# Patient Record
Sex: MALE | Race: WHITE
[De-identification: names, ages, dates, MRNs, and addresses within clinical notes are randomized per-mention and may not be internally consistent; named-entity substitution may affect disease eponyms.]

---

## 2019-07-08 LAB
BLD SMEAR INTERP: (no result)
BUN BLD-MCNC: 23 MG/DL (ref 7–18)
BURR CELLS BLD QL SMEAR: (no result)
GLUCOSE SERPLBLD-MCNC: 96 MG/DL (ref 74–106)
HCT VFR BLD CALC: 39.1 % (ref 39.6–49)
HYPOCHROMIA BLD QL: (no result)
LYMPHOCYTES # SPEC AUTO: 1.9 K/UL (ref 0.7–4.9)
MORPHOLOGY BLD-IMP: (no result)
PMV BLD: 8.8 FL (ref 7.6–11.3)
POTASSIUM SERPL-SCNC: 4.2 MMOL/L (ref 3.5–5.1)
RBC # BLD: 6.1 M/UL (ref 4.33–5.43)
TARGETS BLD QL SMEAR: (no result)

## 2019-07-08 NOTE — EKG
Test Date:    2019-07-08               Test Time:    15:13:19

Technician:   LIN                                    

                                                     

MEASUREMENT RESULTS:                                       

Intervals:                                           

Rate:         77                                     

CT:           182                                    

QRSD:         80                                     

QT:           388                                    

QTc:          439                                    

Axis:                                                

P:            48                                     

CT:           182                                    

QRS:          -30                                    

T:            52                                     

                                                     

INTERPRETIVE STATEMENTS:                                       

                                                     

Normal sinus rhythm

Left axis deviation

Abnormal ECG

Compared to ECG 09/07/2018 08:45:45

Left-axis deviation now present



Electronically Signed On 07-08-19 16:19:18 CDT by Dario Salomon

## 2019-07-08 NOTE — RAD REPORT
EXAM DESCRIPTION:  Boni Andrea (2 Views)7/8/2019 3:26 pm

 

CLINICAL HISTORY:  Abdominal pain/preop for hernia repair

 

COMPARISON:  None

 

FINDINGS:   The lungs appear clear of acute infiltrate. The heart is borderline enlarged

 

IMPRESSION:   No acute abnormalities displayed

## 2019-07-10 ENCOUNTER — HOSPITAL ENCOUNTER (OUTPATIENT)
Dept: HOSPITAL 97 - OR | Age: 79
Discharge: HOME | End: 2019-07-10
Attending: SURGERY
Payer: COMMERCIAL

## 2019-07-10 DIAGNOSIS — Z85.51: ICD-10-CM

## 2019-07-10 DIAGNOSIS — Z88.2: ICD-10-CM

## 2019-07-10 DIAGNOSIS — K40.30: Primary | ICD-10-CM

## 2019-07-10 PROCEDURE — 88302 TISSUE EXAM BY PATHOLOGIST: CPT

## 2019-07-10 PROCEDURE — 85025 COMPLETE CBC W/AUTO DIFF WBC: CPT

## 2019-07-10 PROCEDURE — 0YU60JZ SUPPLEMENT LEFT INGUINAL REGION WITH SYNTHETIC SUBSTITUTE, OPEN APPROACH: ICD-10-PCS

## 2019-07-10 PROCEDURE — 80048 BASIC METABOLIC PNL TOTAL CA: CPT

## 2019-07-10 PROCEDURE — 93005 ELECTROCARDIOGRAM TRACING: CPT

## 2019-07-10 PROCEDURE — 36415 COLL VENOUS BLD VENIPUNCTURE: CPT

## 2019-07-10 PROCEDURE — 49507 PRP I/HERN INIT BLOCK >5 YR: CPT

## 2019-07-10 PROCEDURE — 71046 X-RAY EXAM CHEST 2 VIEWS: CPT

## 2019-07-10 NOTE — XMS REPORT
Clinical Summary

 Created on:July 10, 2019



Patient:Josep Santizo Jr.

Sex:Male

:1940

External Reference #:[id number]





Demographics







 Address  [address]



   [place] [address]

 

 Home Phone  [phone]

 

 Mobile Phone  [phone]

 

 Email Address  [email or web address]

 

 Preferred Language  English

 

 Marital Status  Unknown

 

 Pentecostal Affiliation  Unknown

 

 Race  White

 

 Ethnic Group  Not  or 









Author







 Organization  [facility]

 

 Address  [address] [place] [address]









Support







 Name  Relationship  Address  Phone

 

 Nida Santizo  Unavailable  [address]  [phone]



     [place] [address]  

 

 Marcella Mei  Unavailable  Unavailable  [phone]

 

 Theo Santizo  Shannanmiriam  Unavailable  [phone]









Care Team Providers







 Name  Role  Phone

 

 Marcela Beasley  Primary Care Provider  [phone]









Allergies







 Active Allergy  Reactions  Severity  Noted Date  Comments

 

 Sulfa (Sulfonamide  Other (See Comments)    09/10/2018  Childhood reaction, was



 Antibiotics)        told not to take







Medications







 Medication  Sig  Dispensed  Refills  Start Date  End Date  Status

 

 alfuzosin (UROXATRAL)  Take 10 mg by    0      Active



 10 mg 24 hr tablet  mouth daily.          

 

 timolol (BETIMOL) 0.5 %  Place 1 drop    0      Active



 ophthalmic solution  into the right          



   eye daily.          

 

 nitrofurantoin,  Take 1 capsule  6 capsule  0  2018  09/15/2018  



 macrocrystal-monohydrat  (100 mg total)          



 e, (MACROBID) 100 MG  by mouth 2          



 capsule  (two) times          



   daily for 3          



   days.          

 

 phenazopyridine  Take 1 tablet  10 tablet  0  2018  09/15/2018  



 (PYRIDIUM) 200 MG  (200 mg total)          



 tablet  by mouth 3          



   (three) times          



   daily as          



   needed for          



   Pain for up to          



   3 days.          

 

 acetaminophen-codeine  Take 1 tablet  10 tablet  0  2018  




 (TYLENOL-CODEINE #3)  by mouth every          



 300-30 mg per tablet  4 (four) hours          



   as needed for          



   up to 10 days.          



   Max Daily          



   Amount: 6          



   tablets          

 

 nitrofurantoin,  Take 1 capsule  6 capsule  0  10/26/2018  10/29/2018  



 macrocrystal-monohydrat  (100 mg total)          



 e, (MACROBID) 100 MG  by mouth 2          



 capsule  (two) times          



   daily for 3          



   days.          

 

 phenazopyridine  Take 1 tablet  10 tablet  0  10/26/2018  10/29/2018  



 (PYRIDIUM) 100 MG  (100 mg total)          



 tablet  by mouth 3          



   (three) times          



   daily as          



   needed for          



   Pain for up to          



   3 days.          







Active Problems







 Problem  Noted Date

 

 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

 

 Bladder tumor  2018







Encounters







 Date  Type  Specialty  Care Team  Description

 

 2018  Hospital Encounter  Research  Gian Ac MD  

 

 10/26/2018  Surgery    Gian Ac  CYSTGWEN ROJAS MD  

 

 10/26/2018  Anesthesia Event    Matthew Camarillo MD  

 

 10/26/2018  Hospital Encounter    Gian Ac MD  

 

 10/25/2018  Hospital Encounter  Pre-Admission  Gian Ac MD



  



       Resource, [facility]  



       Preadmit Phone  

 

 2018  Anesthesia Event    Jenni Vicente MD  

 

 2018  Surgery    Gian Ac  CYSTDORA ROJAS MD  [facility]

 

 2018  Hospital Encounter    Gian Ac MD  

 

 2018  Hospital Encounter  Pre-Admission  Resource, [facility]  



     Testing  Preadmit Phone  



after 2018



Social History







 Tobacco Use  Types  Packs/Day  Years Used  Date

 

 Never Smoker        









 Smokeless Tobacco: Never Used      









 Alcohol Use  Drinks/Week  oz/Week  Comments

 

 No      









 Sex Assigned at Birth  Date Recorded

 

 Not on file  









 Job Start Date  Occupation  Industry

 

 Not on file  Not on file  Not on file









 Travel History  Travel Start  Travel End









 No recent travel history available.







Last Filed Vital Signs







 Vital Sign  Reading  Time Taken

 

 Blood Pressure  151/63  10/26/2018 12:20 PM CDT

 

 Pulse  84  10/26/2018 12:20 PM CDT

 

 Temperature  36.6 C (97.8 F)  10/26/2018 12:20 PM CDT

 

 Respiratory Rate  16  10/26/2018 12:20 PM CDT

 

 Oxygen Saturation  95%  10/26/2018 12:20 PM CDT

 

 Inhaled Oxygen Concentration  -  -

 

 Weight  103.7 kg (228 lb 11.2 oz)  10/26/2018 10:12 AM CDT

 

 Height  177.8 cm (5' 10")  10/26/2018 10:12 AM CDT

 

 Body Mass Index  32.82  10/26/2018 10:12 AM CDT







Plan of Treatment

Not on file



Procedures







 Procedure Name  Priority  Date/Time  Associated Diagnosis  Comments

 

 CYSTOSCOPY,TURBT    10/26/2018 11:22 AM CDT  Malignant neoplasm of  



       urinary bladder,  



       unspecified site (HCC)  









   Case Notes







   60 MINS PER AYLISSA









 TISSUE EXAM  AP Routine  10/26/2018 11:01 AM    Results for this



     CDT    procedure are in



         the results



         section.

 

 TISSUE EXAM  AP Routine  2018  1:45 PM    Results for this



     CDT    procedure are in



         the results



         section.

 

 CYSTOSCOPY,TURBT    2018 10:40 AM  Malignant neoplasm  



     CDT  of urinary bladder,  



       unspecified site  



       (HCC)  









   Special Needs







   ([facility])









 CYSTOSCOPY,BLUE LIGHT [facility]    2018 10:40 AM CDT  Malignant neoplasm 
of  



       urinary bladder,  



       unspecified site (HCC)  









   Special Needs







   ([facility])



after 2018



Results

Tissue Exam (10/26/2018 11:01 AM CDT)Only the most recent of2 resultswithin the 
time period is included.





 Component  Value  Ref Range  Performed At

 

 Case Report  Surgical Pathology Report
 Case: [id number]
    [facility]



   Authorizing Provider:Gian Ac MDCollected:
 10/26/2018 1101    [facility]



   Ordering Location: [facility] PERIOPERATIVE Received:
10/26/2018 1337    



    SERVICES

    



   Pathologist: Jagdish Jacobo MD

    



   Specimen:Bladder Tumor, re-resection of left anterior lateral wall 
high grade T1 tumor    



       

 

 DIAGNOSIS  URINARY BLADDER, LEFT ANTERIOR LATERAL WALL, TRANSURETHRAL RE-
RESECTION:    [facility]



      - PREVIOUS RESECTION SITE CHANGES WITH ACUTE AND CHRONIC INFLAMMATION    
[facility]



      - NEGATIVE FOR DYSPLASIA OR MALIGNANCY    



      - MUSCULARIS PROPRIA PRESENT    



         Signing Pathologist Direct Phone Line: [phone]    

 

 CPT Code(s)  22029    [facility]

 

 CLINICAL HISTORY  Malignant neoplasm of    [facility]



   urinary bladder    [facility]

 

 SPECIMEN SOURCE  Re-resection of left    [facility]



   anterior lateral wall,    [facility]



   high-grade T1 tumor    

 

 GROSS DESCRIPTION  Specimen consists of    [facility]



   multiple fragments of    [facility]



   cauterized tan tissue    



   measuring 2 x 1 x 0.4 cm in    



   aggregate submitted entirely    



   in A1. CG/ew    

 

 MICROSCOPIC DESCRIPTION  Performed.    [facility]









 Specimen

 

 Tissue - Bladder Tumor









 Performing Organization  Address  City/State/Zipcode  Phone Number

 

 [facility]  [address] [place] [address] [phone]
[phone]



 CENTER      



after 2018



Insurance







 Payer  Benefit Plan /  Subscriber ID  Type  Phone  Address



   Group        

 

 [employer] - MEDICARE  [employer] HMO  xxxxxxxx    [phone]  [address]







 [employer]  POS [place]        [place]



           [address]









 Guarantor Name  Account Type  Relation to  Date of  Phone  Billing



     Patient  Birth    Address

 

 Josep Santizo  Personal/Family  Self  1940  [phone]  [address]        ([place])  [address] [place],



           [place] [address]

## 2019-07-10 NOTE — OP
Date of Procedure:  07/10/2019



Surgeon:  Alvino Azul MD



Assistant:  PEPE Winkler.



Preoperative Diagnosis:  Incarcerated left inguinal hernia.



Postoperative Diagnosis:  Incarcerated left inguinal hernia.



Procedure:  Repair incarcerated left inguinal hernia.



Estimated Blood Loss:  Minimal.



Specimen:  Hernia sac.



Finding:  As above.



Anesthesia:  General.



Complications:  None.



Disposition:  The patient tolerated the procedure in stable condition, was taken to Recovery in good 
general condition.



Procedure In Detail:  The patient was brought to the OR and placed in supine position.  General anest
hesia was begun.  The patient was prepped and draped in usual sterile fashion.  Marcaine 0.5% was inf
iltrated in a field block fashion in the left groin.  A 15-blade was used to make a 5 cm oblique inci
leny between the pubic tubercle and the anterior iliac superior spine.  Subcutaneous tissue divided. 
 Alfonso's fascia was identified and divided and deep to that, the aponeurosis was non-existent, was v
blayne greatly attenuated.  The cord structures were identified, mobilized at the pubic tubercle, and th
e cord skeletonized and a large indirect hernia sac identified and opened.  There was incarcerated om
entum which was carefully reduced back into the peritoneal cavity and then after the hernia sac was f
venus from the cord structures.  High ligation of the hernia sac was done with #1 Prolene suture ligat
ure and free hand tie.  The hernia sac was excised and sent to Pathology as specimen.  Then, approxim
ately a 3 cm defect remained and then a large plug was placed in the internal ring secured with Versa
Tack stapler.  Then Onlay mesh was placed on the inguinal floor secured medially to the pubic tubercl
e, inferiorly to the shelving edge, laterally to each other and superior to the conjoined tender.  Th
en, cord structures and ilioinguinal nerve were placed back in anatomical location.  Then, the Alfonso
's fascia was closed with 3-0 chromic, staples to close the skin.  Sterile dressing was applied.  The
 patient was awakened and taken to Recovery in good condition.



Discharge Note:  The patient will go to Day Surgery, then home when stable.



Disposition:  Home.



Condition:  Stable.



Discharge Instructions:  Resume home medications and diet.  Activities as tolerated.  No heavy liftin
g.  Remove outer dressing in 2 days.  Shower.  Keep wound clean and dry.  Followup in my office 1 derrick bruner, call for appointment.  Tylenol No.3 one tablet p.o. q.4 p.r.n. pain, scrotal support and ice pack 
is ordered.





AS/ADINA

DD:  07/10/2019 10:34:52Voice ID:  [id number]

DT:  07/10/2019 21:14:44Report ID:  [id number]
